# Patient Record
Sex: FEMALE | NOT HISPANIC OR LATINO | URBAN - NONMETROPOLITAN AREA
[De-identification: names, ages, dates, MRNs, and addresses within clinical notes are randomized per-mention and may not be internally consistent; named-entity substitution may affect disease eponyms.]

---
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
## 2022-08-31 ENCOUNTER — APPOINTMENT (RX ONLY)
Dept: URBAN - NONMETROPOLITAN AREA CLINIC 3 | Facility: CLINIC | Age: 22
Setting detail: DERMATOLOGY
End: 2022-08-31

## 2022-08-31 DIAGNOSIS — L70.0 ACNE VULGARIS: ICD-10-CM

## 2022-08-31 PROCEDURE — ? PRODUCT LINE (ACNE)

## 2022-08-31 NOTE — PROCEDURE: PRODUCT LINE (ACNE)
Product 52 Units: 0
Product 76 Price (In Dollars - Numeric Only, No Special Characters Or $): 0.00
Detail Level: Zone
Assigning Risk Information: Per AMA, level of risk is based upon consequences of the problem(s) addressed at the encounter when appropriately treated. Risk also includes medical decision making related to the need to initiate or forego further testing, treatment and/or hospitalization. Over the counter medication are assigned a risk level of low. Prescription medication management is assigned a risk level of moderate.
Risk Of Complication Category: Moderate (Prescription Medication Management)
Allow Plan To Count Towards E/M Coding: Yes
Name Of Product 1: [facility] Acne Triple Gel #[id number]
Product 1 Price (In Dollars - Numeric Only, No Special Characters Or $): 47.25
Product 1 Units: 1
Product 1 Application Directions: Apply to face nightly

## 2023-12-15 ENCOUNTER — RX ONLY (OUTPATIENT)
Age: 23
Setting detail: RX ONLY
End: 2023-12-15

## 2023-12-15 RX ORDER — TRETINOIN/BENZOYL/CLINDAM/NIAC 0.05-5-1 %
GEL (GRAM) TOPICAL
Qty: 30 | Refills: 3 | Status: ERX | COMMUNITY
Start: 2023-12-14